# Patient Record
Sex: FEMALE | Race: WHITE | NOT HISPANIC OR LATINO | Employment: STUDENT | URBAN - METROPOLITAN AREA
[De-identification: names, ages, dates, MRNs, and addresses within clinical notes are randomized per-mention and may not be internally consistent; named-entity substitution may affect disease eponyms.]

---
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
## 2025-06-03 ENCOUNTER — OFFICE VISIT (OUTPATIENT)
Dept: FAMILY MEDICINE | Facility: CLINIC | Age: 20
End: 2025-06-03
Payer: MEDICAID

## 2025-06-03 VITALS
OXYGEN SATURATION: 98 % | BODY MASS INDEX: 37.81 KG/M2 | WEIGHT: 279.13 LBS | TEMPERATURE: 98 F | SYSTOLIC BLOOD PRESSURE: 132 MMHG | HEART RATE: 95 BPM | RESPIRATION RATE: 18 BRPM | HEIGHT: 72 IN | DIASTOLIC BLOOD PRESSURE: 85 MMHG

## 2025-06-03 DIAGNOSIS — L30.9 ECZEMA, UNSPECIFIED TYPE: ICD-10-CM

## 2025-06-03 DIAGNOSIS — J45.990 EXERCISE INDUCED BRONCHOSPASM: ICD-10-CM

## 2025-06-03 DIAGNOSIS — Z30.9 ENCOUNTER FOR CONTRACEPTIVE MANAGEMENT, UNSPECIFIED TYPE: ICD-10-CM

## 2025-06-03 DIAGNOSIS — Z00.00 WELL ADULT EXAM: Primary | ICD-10-CM

## 2025-06-03 LAB
25(OH)D3+25(OH)D2 SERPL-MCNC: 13 NG/ML (ref 30–80)
ALBUMIN SERPL-MCNC: 3.9 G/DL (ref 3.5–5)
ALBUMIN/GLOB SERPL: 1.2 RATIO (ref 1.1–2)
ALP SERPL-CCNC: 64 UNIT/L (ref 40–150)
ALT SERPL-CCNC: 6 UNIT/L (ref 0–55)
ANION GAP SERPL CALC-SCNC: 8 MEQ/L
AST SERPL-CCNC: 9 UNIT/L (ref 11–45)
BACTERIA #/AREA URNS AUTO: ABNORMAL /HPF
BASOPHILS # BLD AUTO: 0.02 X10(3)/MCL
BASOPHILS NFR BLD AUTO: 0.3 %
BILIRUB SERPL-MCNC: 0.6 MG/DL
BILIRUB UR QL STRIP.AUTO: NEGATIVE
BUN SERPL-MCNC: 9.5 MG/DL (ref 7–18.7)
CALCIUM SERPL-MCNC: 9.2 MG/DL (ref 8.4–10.2)
CAOX CRY UR QL COMP ASSIST: ABNORMAL
CHLORIDE SERPL-SCNC: 108 MMOL/L (ref 98–107)
CHOLEST SERPL-MCNC: 130 MG/DL
CHOLEST/HDLC SERPL: 4 {RATIO} (ref 0–5)
CLARITY UR: ABNORMAL
CO2 SERPL-SCNC: 22 MMOL/L (ref 22–29)
COLOR UR AUTO: YELLOW
CREAT SERPL-MCNC: 0.73 MG/DL (ref 0.55–1.02)
CREAT/UREA NIT SERPL: 13
EOSINOPHIL # BLD AUTO: 0.1 X10(3)/MCL (ref 0–0.9)
EOSINOPHIL NFR BLD AUTO: 1.4 %
ERYTHROCYTE [DISTWIDTH] IN BLOOD BY AUTOMATED COUNT: 15.6 % (ref 11.5–17)
EST. AVERAGE GLUCOSE BLD GHB EST-MCNC: 99.7 MG/DL
GFR SERPLBLD CREATININE-BSD FMLA CKD-EPI: >60 ML/MIN/1.73/M2
GLOBULIN SER-MCNC: 3.2 GM/DL (ref 2.4–3.5)
GLUCOSE SERPL-MCNC: 91 MG/DL (ref 74–100)
GLUCOSE UR QL STRIP: NORMAL
HBA1C MFR BLD: 5.1 %
HCT VFR BLD AUTO: 36 % (ref 37–47)
HDLC SERPL-MCNC: 35 MG/DL (ref 35–60)
HGB BLD-MCNC: 11.1 G/DL (ref 12–16)
HGB UR QL STRIP: NEGATIVE
HYALINE CASTS #/AREA URNS LPF: ABNORMAL /LPF
IMM GRANULOCYTES # BLD AUTO: 0.02 X10(3)/MCL (ref 0–0.04)
IMM GRANULOCYTES NFR BLD AUTO: 0.3 %
KETONES UR QL STRIP: NEGATIVE
LDLC SERPL CALC-MCNC: 80 MG/DL (ref 50–140)
LEUKOCYTE ESTERASE UR QL STRIP: 75
LYMPHOCYTES # BLD AUTO: 2.06 X10(3)/MCL (ref 0.6–4.6)
LYMPHOCYTES NFR BLD AUTO: 28 %
MCH RBC QN AUTO: 22.8 PG (ref 27–31)
MCHC RBC AUTO-ENTMCNC: 30.8 G/DL (ref 33–36)
MCV RBC AUTO: 73.9 FL (ref 80–94)
MONOCYTES # BLD AUTO: 0.59 X10(3)/MCL (ref 0.1–1.3)
MONOCYTES NFR BLD AUTO: 8 %
MUCOUS THREADS URNS QL MICRO: ABNORMAL /LPF
NEUTROPHILS # BLD AUTO: 4.58 X10(3)/MCL (ref 2.1–9.2)
NEUTROPHILS NFR BLD AUTO: 62 %
NITRITE UR QL STRIP: NEGATIVE
NRBC BLD AUTO-RTO: 0 %
PH UR STRIP: 6 [PH]
PLATELET # BLD AUTO: 369 X10(3)/MCL (ref 130–400)
PMV BLD AUTO: 10.8 FL (ref 7.4–10.4)
POTASSIUM SERPL-SCNC: 4 MMOL/L (ref 3.5–5.1)
PROT SERPL-MCNC: 7.1 GM/DL (ref 6.4–8.3)
PROT UR QL STRIP: ABNORMAL
RBC # BLD AUTO: 4.87 X10(6)/MCL (ref 4.2–5.4)
RBC #/AREA URNS AUTO: ABNORMAL /HPF
SODIUM SERPL-SCNC: 138 MMOL/L (ref 136–145)
SP GR UR STRIP.AUTO: 1.04 (ref 1–1.03)
SQUAMOUS #/AREA URNS LPF: ABNORMAL /HPF
TRIGL SERPL-MCNC: 74 MG/DL (ref 37–140)
TSH SERPL-ACNC: 0.81 UIU/ML (ref 0.35–4.94)
UROBILINOGEN UR STRIP-ACNC: NORMAL
VLDLC SERPL CALC-MCNC: 15 MG/DL
WBC # BLD AUTO: 7.37 X10(3)/MCL (ref 4.5–11.5)
WBC #/AREA URNS AUTO: ABNORMAL /HPF

## 2025-06-03 PROCEDURE — 82306 VITAMIN D 25 HYDROXY: CPT | Performed by: NURSE PRACTITIONER

## 2025-06-03 PROCEDURE — 83036 HEMOGLOBIN GLYCOSYLATED A1C: CPT | Performed by: NURSE PRACTITIONER

## 2025-06-03 PROCEDURE — 80061 LIPID PANEL: CPT | Performed by: NURSE PRACTITIONER

## 2025-06-03 PROCEDURE — 85025 COMPLETE CBC W/AUTO DIFF WBC: CPT | Performed by: NURSE PRACTITIONER

## 2025-06-03 PROCEDURE — 80053 COMPREHEN METABOLIC PANEL: CPT | Performed by: NURSE PRACTITIONER

## 2025-06-03 PROCEDURE — 81001 URINALYSIS AUTO W/SCOPE: CPT | Performed by: NURSE PRACTITIONER

## 2025-06-03 PROCEDURE — 99215 OFFICE O/P EST HI 40 MIN: CPT | Mod: PBBFAC,PN | Performed by: NURSE PRACTITIONER

## 2025-06-03 PROCEDURE — 84443 ASSAY THYROID STIM HORMONE: CPT | Performed by: NURSE PRACTITIONER

## 2025-06-03 RX ORDER — ALBUTEROL SULFATE 90 UG/1
2 INHALANT RESPIRATORY (INHALATION) EVERY 4 HOURS PRN
Qty: 18 G | Refills: 6 | Status: SHIPPED | OUTPATIENT
Start: 2025-06-03

## 2025-06-04 ENCOUNTER — RESULTS FOLLOW-UP (OUTPATIENT)
Dept: FAMILY MEDICINE | Facility: CLINIC | Age: 20
End: 2025-06-04

## 2025-06-04 DIAGNOSIS — N30.00 ACUTE CYSTITIS WITHOUT HEMATURIA: ICD-10-CM

## 2025-06-04 DIAGNOSIS — E55.9 VITAMIN D DEFICIENCY: Primary | ICD-10-CM

## 2025-06-04 RX ORDER — SULFAMETHOXAZOLE AND TRIMETHOPRIM 800; 160 MG/1; MG/1
1 TABLET ORAL 2 TIMES DAILY
Qty: 14 TABLET | Refills: 0 | Status: SHIPPED | OUTPATIENT
Start: 2025-06-04 | End: 2025-07-10 | Stop reason: ALTCHOICE

## 2025-06-04 RX ORDER — ASPIRIN 325 MG
50000 TABLET, DELAYED RELEASE (ENTERIC COATED) ORAL
Qty: 12 CAPSULE | Refills: 0 | Status: SHIPPED | OUTPATIENT
Start: 2025-06-04

## 2025-06-05 NOTE — PROGRESS NOTES
Please inform the patient that while we will discuss her lab work results at her upcoming Office visit, her urine sample did grow out a bacteria indicating a UTI. I have sent a prescription for her to the pharmacy for her to start taking. Also, advise the following.    Start antibiotics as prescribed.   Complete the full course of the medication.  Report any continuing signs such as nausea/vomiting,visible blood in urine, increased low back or flank pain, worsening burning upon urination after antibiotic completion or fever.  Drink plenty of water.  Avoid soda or carbonated beverages.   Urinate frequently; do not hold urine for extended periods of time.  Wear cotton underwear, avoid tight fitting pants.  Use OTC AZO or pyridium for relief of urinary spasms.  Wipe front to back, urinate after sexual intercourse, and avoid scented or irritating feminine products.    Also her Vit D level was low. Increase foods high in Vitamin D such as fish oil, cod liver oil, salmon, milk fortified with vitamin D.  RX Vitamin D3 56797 IU weekly x 12 weeks.  Complete entire 12 weeks of Vitamin D prescription.  After completion of prescription (12 weeks/3 months), begin taking Vitamin D 2,000 IU daily (purchase over the counter).    Thanks,  Jessenia Garcia, RICH-C     No orders of the defined types were placed in this encounter.      Medications Ordered This Encounter   Medications    cholecalciferol, vitamin D3, 1,250 mcg (50,000 unit) capsule     Sig: Take 1 capsule (50,000 Units total) by mouth every 7 days.     Dispense:  12 capsule     Refill:  0    sulfamethoxazole-trimethoprim 800-160mg (BACTRIM DS) 800-160 mg Tab     Sig: Take 1 tablet by mouth 2 (two) times daily. for 7 days     Dispense:  14 tablet     Refill:  0

## 2025-07-08 ENCOUNTER — TELEPHONE (OUTPATIENT)
Dept: FAMILY MEDICINE | Facility: CLINIC | Age: 20
End: 2025-07-08
Payer: MEDICAID

## 2025-07-08 NOTE — TELEPHONE ENCOUNTER
?? **Pre-Visit Call Screening**       ?? Reason for Visit  Chief complaint: Lab review-Virtual visit           ?? Lab Work   - Pre-visit labs ordered: Yes  - Labs completed:  Yes

## 2025-07-10 ENCOUNTER — OFFICE VISIT (OUTPATIENT)
Dept: FAMILY MEDICINE | Facility: CLINIC | Age: 20
End: 2025-07-10
Payer: MEDICAID

## 2025-07-10 DIAGNOSIS — E55.9 VITAMIN D DEFICIENCY: Primary | ICD-10-CM

## 2025-07-10 NOTE — ASSESSMENT & PLAN NOTE
- Increase foods high in Vitamin D such as fish oil, cod liver oil, salmon, milk fortified with vitamin D.  - continue Vitamin D3 99767 IU weekly x 12 weeks.  - Complete entire 12 weeks of Vitamin D prescription.  - After completion of prescription (12 weeks/3 months), begin taking Vitamin D 2,000 IU daily (purchase over the counter).

## 2025-07-10 NOTE — PROGRESS NOTES
[facility]        Patient ID: [id number]     Chief Complaint: Follow-up      HPI:     This is a telemedicine note. Patient was treated using telemedicine, real time audio and video, according to [facility] protocols. Jessenia MOTA FNP-C, conducted the visit from the [facility]. The patient participated in the visit at a non-[facility] location selected by the patient, identified below. I am licensed in the state where the patient stated they are located. The patient stated that they understood and accepted the privacy and security risks to their information at their location. This visit is not recorded.    Patient was located at the patient's home.        Josh Sanchez is a 20 y.o. female here today for a telemedicine visit.    History of Present Illness    06/03/2025: Patient presents to clinic today to establish care with new provider in clinic and wellness visit. Current medical problems include chondromalacia of right patella, exercise induced bronchospasm, allergic rhinitis and eczema. Followed by ortho (Dr. Garay) as needed and psychiatry (Dr. Clinton Yin) for ADHD, anxiety and depression every 6 months. Current student at , majoring in Informatics.     Patient is requesting referral to Dermatology for moles on her face and eczema.  She also has a tear in upper auricle of left ear wearing hearing was torn out.  Patient reports that moles on face have always been flat and over time have now become raised.  Almost are similar in color with the exception of 2 on face which are lighter.  All are regular shaped.  No irregular texture.  She does have eczema to bilateral ankles that she controls with Kenalog cream.     She does experience exercise-induced bronchospasms which is controlled with albuterol.  This has improved since she is no longer playing volleyball/sports.  She does continue to carry albuterol to use as needed.     She is also requesting referral to gyn to start birth control.  [facility]  "that she needs it for"hormone regulation" and to "test it out." Reports that she has never been sexually active but does not want to wait to be placed on birth control.  Reports that all of the women in her family have had some type of difficulty taking birth control (pills and "implants") due to pain and irregular menses.    07/10/2025: Patient presents to clinic today for follow up regarding lab results.  Was started on Vit D weekly supplement due to low Vit D levels and Bactrim due to UTI. Has completed medication for UTI.    Has appt for gyn scheduled.  She has not received call from dermatology.  She is requesting referral to dermatologist in [place].  She does not know the name of the provider.  Agrees to identify provider and notify office so referral can be sent.         Health Maintenance         Date Due Completion Date    Hepatitis C Screening Never done ---    HIV Screening Never done ---    Influenza Vaccine (1) 09/01/2025 10/5/2009    TETANUS VACCINE 04/15/2026 4/15/2016    RSV Vaccine (Age 60+ and Pregnant patients) (1 - 1-dose 75+ series) 04/14/2080 ---            Past Medical History:   Diagnosis Date    33-34 completed weeks of gestation     34 weeker; NICU x9 days    ADHD (attention deficit hyperactivity disorder)     ADHD foll by Dr. Strange    Allergy     AR    Lichen simplex chronicus 11/6/2020    Dx by Dr. Weil on ankles    Otitis media     Patellar malalignment syndrome of right knee     RAD (reactive airway disease)     exercise induced asthma    Sinus infection         Past Surgical History:   Procedure Laterality Date    ADENOIDECTOMY      ARTHROSCOPIC CHONDROPLASTY OF KNEE JOINT Right 10/06/2021    Procedure: ARTHROSCOPY, KNEE, WITH CHONDROPLASTY;  Surgeon: Gaston Garay MD;  Location: [facility];  Service: Orthopedics;  Laterality: Right;    TONSILLECTOMY Bilateral 06/2022    TYMPANOSTOMY TUBE PLACEMENT      UMBILICAL HERNIA REPAIR          Social History     Tobacco Use    Smoking status: " Never    Smokeless tobacco: Never   Substance and Sexual Activity    Alcohol use: No    Drug use: No    Sexual activity: Never        Current Outpatient Medications   Medication Instructions    albuterol (PROVENTIL/VENTOLIN HFA) 90 mcg/actuation inhaler 2 puffs, Inhalation, Every 4 hours PRN    cholecalciferol (vitamin D3) 50,000 Units, Oral, Every 7 days    lisdexamfetamine (VYVANSE) 60 mg, Every morning    naproxen sodium (ANAPROX) 220 mg, 2 times daily PRN    propranoloL (INDERAL) 20 mg, 2 times daily PRN    triamcinolone acetonide 0.1% (KENALOG) 0.1 % ointment Topical (Top), 2 times daily PRN       Review of patient's allergies indicates:  No Known Allergies     Patient Care Team:  Jessenia Garcia FNP as PCP - General (Family Medicine)     Subjective:     Review of Systems   Constitutional:  Negative for activity change and unexpected weight change.   HENT:  Negative for hearing loss, rhinorrhea and trouble swallowing.    Eyes:  Negative for discharge and visual disturbance.   Respiratory:  Negative for chest tightness and wheezing.    Cardiovascular:  Negative for chest pain and palpitations.   Gastrointestinal:  Negative for blood in stool, constipation, diarrhea and vomiting.   Endocrine: Negative for polydipsia and polyuria.   Genitourinary:  Negative for difficulty urinating, dysuria, hematuria and menstrual problem.   Musculoskeletal:  Negative for arthralgias, joint swelling and neck pain.   Neurological:  Negative for weakness and headaches.   Psychiatric/Behavioral:  Negative for confusion and dysphoric mood.        12 point review of systems conducted, negative except as stated in the history of present illness. See HPI for details.    Objective:     There were no vitals taken for this visit.    Physical Exam  Constitutional:       Appearance: Normal appearance.   HENT:      Head: Normocephalic and atraumatic.   Eyes:      Extraocular Movements: Extraocular movements intact.   Pulmonary:      Effort:  Pulmonary effort is normal.   Neurological:      Mental Status: She is alert. Mental status is at baseline.   Psychiatric:         Mood and Affect: Mood normal.         Thought Content: Thought content normal.           Assessment:       ICD-10-CM ICD-9-CM   1. Vitamin D deficiency  E55.9 268.9        Labs Reviewed:     Chemistry:  Lab Results   Component Value Date     06/03/2025    K 4.0 06/03/2025    BUN 9.5 06/03/2025    CREATININE 0.73 06/03/2025    EGFRNORACEVR >60 06/03/2025    CALCIUM 9.2 06/03/2025    ALKPHOS 64 06/03/2025    ALBUMIN 3.9 06/03/2025    AST 9 (L) 06/03/2025    ALT 6 06/03/2025    [id number] 13 (L) 06/03/2025    TSH 0.807 06/03/2025        Lab Results   Component Value Date    HGBA1C 5.1 06/03/2025        Hematology:  Lab Results   Component Value Date    WBC 7.37 06/03/2025    HGB 11.1 (L) 06/03/2025    HCT 36.0 (L) 06/03/2025     06/03/2025       Lipid Panel:  Lab Results   Component Value Date    CHOL 130 06/03/2025    HDL 35 06/03/2025    LDL 80.00 06/03/2025    TRIG 74 06/03/2025    TOTALCHOLEST 4 06/03/2025        Urine:  Lab Results   Component Value Date    APPEARANCEUA Turbid (A) 06/03/2025    SGUA 1.036 (H) 06/03/2025    PROTEINUA Trace (A) 06/03/2025    KETONESUA Negative 06/03/2025    LEUKOCYTESUR 75 (A) 06/03/2025    RBCUA 0-5 06/03/2025    WBCUA 6-10 (A) 06/03/2025    BACTERIA Few (A) 06/03/2025    SQEPUA Moderate (A) 06/03/2025    HYALINECASTS None Seen 06/03/2025        Plan:     1. Vitamin D deficiency  Assessment & Plan:  - Increase foods high in Vitamin D such as fish oil, cod liver oil, salmon, milk fortified with vitamin D.  - continue Vitamin D3 44578 IU weekly x 12 weeks.  - Complete entire 12 weeks of Vitamin D prescription.  - After completion of prescription (12 weeks/3 months), begin taking Vitamin D 2,000 IU daily (purchase over the counter).          Follow up in about 6 months (around 1/10/2026). In addition to their scheduled follow up, the patient  has also been instructed to follow up on as needed basis.     Future Appointments   Date Time Provider Department Center   7/24/2025 12:50 PM Olivia Blandon FNP [facility] GYN [place]        Video Time Documentation:  This encounter lasted 15 minutes. More than 50% of this time was spent in counseling and coordination of care. This includes face to face time and non-face to face time preparing to see the patient (eg, review of tests), obtaining and/or reviewing separately obtained history, documenting clinical information in the electronic or other health record, independently interpreting results and communicating results to the patient/family/caregiver, or care coordinator.        RICH QUIÑONES

## 2025-07-23 ENCOUNTER — PATIENT MESSAGE (OUTPATIENT)
Dept: FAMILY MEDICINE | Facility: CLINIC | Age: 20
End: 2025-07-23
Payer: MEDICAID

## 2025-07-23 DIAGNOSIS — L30.9 ECZEMA, UNSPECIFIED TYPE: Primary | ICD-10-CM

## 2025-07-24 ENCOUNTER — OFFICE VISIT (OUTPATIENT)
Dept: GYNECOLOGY | Facility: CLINIC | Age: 20
End: 2025-07-24
Payer: MEDICAID

## 2025-07-24 VITALS
OXYGEN SATURATION: 99 % | BODY MASS INDEX: 37.54 KG/M2 | WEIGHT: 277.19 LBS | DIASTOLIC BLOOD PRESSURE: 83 MMHG | TEMPERATURE: 97 F | HEART RATE: 86 BPM | SYSTOLIC BLOOD PRESSURE: 116 MMHG | RESPIRATION RATE: 18 BRPM | HEIGHT: 72 IN

## 2025-07-24 DIAGNOSIS — Z30.9 ENCOUNTER FOR CONTRACEPTIVE MANAGEMENT, UNSPECIFIED TYPE: Primary | ICD-10-CM

## 2025-07-24 DIAGNOSIS — Z11.3 SCREENING FOR STD (SEXUALLY TRANSMITTED DISEASE): ICD-10-CM

## 2025-07-24 LAB
B-HCG UR QL: NEGATIVE
C TRACH DNA SPEC QL NAA+PROBE: NOT DETECTED
CTP QC/QA: YES
N GONORRHOEA DNA SPEC QL NAA+PROBE: NOT DETECTED
SPECIMEN SOURCE: NORMAL

## 2025-07-24 PROCEDURE — 99214 OFFICE O/P EST MOD 30 MIN: CPT | Mod: PBBFAC

## 2025-07-24 PROCEDURE — 81025 URINE PREGNANCY TEST: CPT | Mod: PBBFAC

## 2025-07-24 PROCEDURE — 87591 N.GONORRHOEAE DNA AMP PROB: CPT

## 2025-07-24 RX ORDER — NORGESTIMATE AND ETHINYL ESTRADIOL 0.25-0.035
1 KIT ORAL DAILY
Qty: 28 TABLET | Refills: 11 | Status: SHIPPED | OUTPATIENT
Start: 2025-07-24 | End: 2026-07-23

## 2025-07-24 NOTE — PROGRESS NOTES
[facility] -  Gynecology / Women's Health Clinic     Subjective:      Patient ID: Josh Sanchez is a 20 y.o. female.    Chief Complaint:  Well Woman      History of Present Illness:  The patient G0 here for annual exam. Her LMP was 25. Period last 6-7 days and changes tampons 1.5-2 hours on heaviest 1 day, monthly. Denies breast or urinary complaints. Denies pelvic pain or abnormal discharge. Pt reports no STIs in the past, admits never been sexually active. HPV vaccinated. Requesting contraception, hx of OCP use. Denies tobacco use. Denies fly hx of breast, ovarian, uterine or colon cancer.     GYN & OB History:  Patient's last menstrual period was 2025 (exact date).     OB History    Para Term  AB Living   0 0 0 0 0 0   SAB IAB Ectopic Multiple Live Births   0 0 0 0 0       Past Medical History:   Diagnosis Date    33-34 completed weeks of gestation     34 weeker; NICU x9 days    ADHD (attention deficit hyperactivity disorder)     ADHD foll by Dr. Strange    Allergy     AR    Anxiety     Depression     Lichen simplex chronicus 2020    Dx by Dr. Weil on ankles    Otitis media     Patellar malalignment syndrome of right knee     RAD (reactive airway disease)     exercise induced asthma    Sinus infection         Past Surgical History:   Procedure Laterality Date    ADENOIDECTOMY      ARTHROSCOPIC CHONDROPLASTY OF KNEE JOINT Right 10/06/2021    Procedure: ARTHROSCOPY, KNEE, WITH CHONDROPLASTY;  Surgeon: Gaston Gaary MD;  Location: [facility];  Service: Orthopedics;  Laterality: Right;    HERNIA REPAIR  2006    ambilical hernia    TONSILLECTOMY Bilateral 2022    TYMPANOSTOMY TUBE PLACEMENT      UMBILICAL HERNIA REPAIR          Social History     Tobacco Use    Smoking status: Never     Passive exposure: Never    Smokeless tobacco: Never   Substance and Sexual Activity    Alcohol use: No    Drug use: No    Sexual activity: Never        Current Outpatient Medications    Medication Instructions    albuterol (PROVENTIL/VENTOLIN HFA) 90 mcg/actuation inhaler 2 puffs, Inhalation, Every 4 hours PRN    cholecalciferol (vitamin D3) 50,000 Units, Oral, Every 7 days    lisdexamfetamine (VYVANSE) 60 mg, Every morning    naproxen sodium (ANAPROX) 220 mg, 2 times daily PRN    norgestimate-ethinyl estradioL (ORTHO-CYCLEN) 0.25-0.035 mg per tablet 1 tablet, Oral, Daily, Take at same time everyday.    propranoloL (INDERAL) 20 mg, 2 times daily PRN    triamcinolone acetonide 0.1% (KENALOG) 0.1 % ointment Topical (Top), 2 times daily PRN       Review of patient's allergies indicates:  No Known Allergies      Review of Systems:  Review of Systems  Negative except for pertinent findings for positives per HPI.     Objective:     Physical Exam   Visit Vitals  /83 (BP Location: Right arm, Patient Position: Sitting)   Pulse 86   Temp 97 °F (36.1 °C) (Oral)   Resp 18   Ht 6' (1.829 m)   Wt 125.7 kg (277 lb 3.2 oz)   LMP 07/07/2025 (Exact Date)   SpO2 99%   BMI 37.60 kg/m²       GENERAL: Well-developed female. No acute distress.    SKIN: Normal to inspection, warm and intact.  PSYCHIATRIC: Patient is oriented to person, place, and time. Mood and affect are normal.    Assessment:       ICD-10-CM ICD-9-CM   1. Encounter for contraceptive management, unspecified type  Z30.9 V25.9   2. Screening for STD (sexually transmitted disease)  Z11.3 V74.5       Plan:     1. Encounter for contraceptive management, unspecified type  -     Ambulatory referral/consult to Gynecology  -     POCT urine pregnancy  -     norgestimate-ethinyl estradioL (ORTHO-CYCLEN) 0.25-0.035 mg per tablet; Take 1 tablet by mouth once daily. Take at same time everyday.  Dispense: 28 tablet; Refill: 11    2. Screening for STD (sexually transmitted disease)  -     Chlamydia/GC, PCR    Pelvic/breast exam declined today, pap deferred d/t age per ACOG recommendations  STD testing    Reviewed the risks and benefits of the following  contraceptive agents:  Barrier protection (need to use with every use, protection against STDs);  Combined hormonal methods of contraception including pills, patch and ring with use daily, weekly and monthly use  Progesterone only methods including pills and depo injection (risk of irregular bleeding, possible 5# weight gain in first year with Depo)  LARC including implants (Nexplanon) 3 years of protection against conception (risk of irregular bleeding for 3-6 months) and IUDs (Amber, Kyleena, Mirena and Paragard with their use for 3, 5 and 10 years). Amber, Kyleena and Mirena IUD has progesterone and many patients have decrease in their cycles, some with amenorrhea and may also have irregular bleeding for first 3-6 months. Paragard has no hormones, cycles continue and may be heavier.  Patient decided to try OCPs    UPT (-). Pt admits to sexual activity since LMP. Explained that UPT does not completely r/o pregnancy, pt understands and accepts risks. Pt to check home UPT in 2-3 weeks.  Denies any medical hx; no hx of blood clots; PE, DVT, MI, CVA, pt is a non-smoker. Discussed risk associated with OCP use such as MI, CVA and DVT/PE, pt accepts risk. Will start Sprintec, pt instructed to take at the same time each day and use back up such as condoms for first month of pills. Instructions on when to start and what to do if she misses a pill. Discussed usual side effects and needs for back up birth control. Reminded patient condoms should be used to prevent STDs.      Call with any GYN concerns    Follow up in about 1 year (around 7/24/2026) for Annual.

## (undated) DEVICE — BANDAGE ACE STERILE 6 REB3116

## (undated) DEVICE — PADDING CAST 6 STERILE 30-322

## (undated) DEVICE — SOLUTION IRRI NS BOTTLE 1000ML R5200-01

## (undated) DEVICE — BLADE AGRESSIVE PLUS 4.0 375-544-000

## (undated) DEVICE — CUFF TOURNIQUET 34DUAL PRT 5921-034-235

## (undated) DEVICE — PACK ARTHROSCOPY SMHS009-07

## (undated) DEVICE — DRESSING ADAPTIC 3X3 6112

## (undated) DEVICE — GLOVE BIOGEL PI GOLD SZ  8.5

## (undated) DEVICE — TUBING CYSTO DOUBLE 654301

## (undated) DEVICE — SOLUTION NACL 0.9% 3000ML

## (undated) DEVICE — PAD BOVIE ADULT

## (undated) DEVICE — UNDERGLOVE BIOGEL MICRO BLUE SZ 8.5

## (undated) DEVICE — SPONGE GAUZE 10S 4X4  442214